# Patient Record
Sex: MALE | Race: WHITE | NOT HISPANIC OR LATINO | Employment: FULL TIME | URBAN - NONMETROPOLITAN AREA
[De-identification: names, ages, dates, MRNs, and addresses within clinical notes are randomized per-mention and may not be internally consistent; named-entity substitution may affect disease eponyms.]

---

## 2023-10-25 ENCOUNTER — OFFICE VISIT (OUTPATIENT)
Dept: INTERNAL MEDICINE | Facility: CLINIC | Age: 33
End: 2023-10-25
Payer: COMMERCIAL

## 2023-10-25 VITALS
HEART RATE: 95 BPM | BODY MASS INDEX: 24.28 KG/M2 | SYSTOLIC BLOOD PRESSURE: 124 MMHG | WEIGHT: 169.6 LBS | OXYGEN SATURATION: 97 % | HEIGHT: 70 IN | TEMPERATURE: 97.3 F | DIASTOLIC BLOOD PRESSURE: 80 MMHG

## 2023-10-25 DIAGNOSIS — K64.9 BLEEDING HEMORRHOIDS: Primary | ICD-10-CM

## 2023-10-25 NOTE — PROGRESS NOTES
"        Subjective     Chief Complaint:  Hemorrhoids    HPI:  Patient presents today to discuss referral for hemorrhoids.  He states that he has had hemorrhoids for several years but has noticed that has gotten significantly worse over the last 3 days.  He states that the other day after he got done running he went to the bathroom and there was blood in his underwear.  However, he initially reported that he only has noticed the blood after having a bowel movement.  He states that the blood is present in the toilet after having a bowel movement as opposed to just when he wipes.  He states that he does have associated pain.  He states that he has lost enough blood that he would be concerned that his hemoglobin may have dropped.  He denies any symptoms that would be concerning for anemia such as shortness of breath, lightheadedness, dizziness, or chest pain.  He has not tried any OTC creams or suppositories.  He is insisting on a referral to general surgery.  He is not interested in establishing care today.    Past Medical History: History reviewed. No pertinent past medical history.  Past Surgical History:  Past Surgical History:   Procedure Laterality Date    CRANIECTOMY      STOMACH SURGERY         Allergies:  No Known Allergies  Medications:  Prior to Admission medications    Not on File       Objective     Vital Signs: /80 (BP Location: Right arm, Patient Position: Sitting, Cuff Size: Adult)   Pulse 95   Temp 97.3 °F (36.3 °C) (Temporal)   Ht 177.8 cm (70\")   Wt 76.9 kg (169 lb 9.6 oz)   SpO2 97%   BMI 24.34 kg/m²   Physical Exam  Vitals and nursing note reviewed.   Constitutional:       General: He is not in acute distress.     Appearance: Normal appearance.   Cardiovascular:      Rate and Rhythm: Normal rate.      Pulses: Normal pulses.   Pulmonary:      Effort: Pulmonary effort is normal. No respiratory distress.   Abdominal:      General: There is no distension.      Palpations: Abdomen is soft.    "   Tenderness: There is no abdominal tenderness.   Skin:     General: Skin is warm and dry.      Findings: No bruising, lesion or rash.   Neurological:      Mental Status: He is alert and oriented to person, place, and time. Mental status is at baseline.      Motor: No weakness.       Results Reviewed:  None available for review    Assessment / Plan     Assessment/Plan:  Diagnoses and all orders for this visit:    1. Bleeding hemorrhoids (Primary)  -     Ambulatory Referral to General Surgery  -     CBC w AUTO Differential       The patient is not interested in establishing care today.  However, I did advise him that we need to proceed with obtaining CBC to make sure that his hemoglobin has not dropped too much since he has been bleeding more over the last 3 days.  We discussed that he needs to try management with a cream or suppositories before being referred to general surgery.  However, he is insisting that a referral be placed and states that medication management will not be effective.  I have placed the referral but I informed the patient that he needs to get Proctozone or Anusol OTC and at least see if this helps while he is waiting on a call from general surgery.  He is agreeable to this.    Return if symptoms worsen or fail to improve. unless patient needs to be seen sooner or acute issues arise.    I have discussed the patient results/orders and and plan/recommendation with them at today's visit.      Mariel Amor, APRN   10/25/2023

## 2023-10-26 ENCOUNTER — TELEPHONE (OUTPATIENT)
Dept: INTERNAL MEDICINE | Facility: CLINIC | Age: 33
End: 2023-10-26

## 2023-10-26 LAB
BASOPHILS # BLD AUTO: 0.04 10*3/MM3 (ref 0–0.2)
BASOPHILS NFR BLD AUTO: 0.6 % (ref 0–1.5)
EOSINOPHIL # BLD AUTO: 0.02 10*3/MM3 (ref 0–0.4)
EOSINOPHIL NFR BLD AUTO: 0.3 % (ref 0.3–6.2)
ERYTHROCYTE [DISTWIDTH] IN BLOOD BY AUTOMATED COUNT: 12.1 % (ref 12.3–15.4)
HCT VFR BLD AUTO: 47.3 % (ref 37.5–51)
HGB BLD-MCNC: 15.6 G/DL (ref 13–17.7)
IMM GRANULOCYTES # BLD AUTO: 0.01 10*3/MM3 (ref 0–0.05)
IMM GRANULOCYTES NFR BLD AUTO: 0.2 % (ref 0–0.5)
LYMPHOCYTES # BLD AUTO: 1.96 10*3/MM3 (ref 0.7–3.1)
LYMPHOCYTES NFR BLD AUTO: 30.7 % (ref 19.6–45.3)
MCH RBC QN AUTO: 28.9 PG (ref 26.6–33)
MCHC RBC AUTO-ENTMCNC: 33 G/DL (ref 31.5–35.7)
MCV RBC AUTO: 87.6 FL (ref 79–97)
MONOCYTES # BLD AUTO: 0.42 10*3/MM3 (ref 0.1–0.9)
MONOCYTES NFR BLD AUTO: 6.6 % (ref 5–12)
NEUTROPHILS # BLD AUTO: 3.94 10*3/MM3 (ref 1.7–7)
NEUTROPHILS NFR BLD AUTO: 61.6 % (ref 42.7–76)
NRBC BLD AUTO-RTO: 0 /100 WBC (ref 0–0.2)
PLATELET # BLD AUTO: 218 10*3/MM3 (ref 140–450)
RBC # BLD AUTO: 5.4 10*6/MM3 (ref 4.14–5.8)
WBC # BLD AUTO: 6.39 10*3/MM3 (ref 3.4–10.8)

## 2023-10-26 NOTE — TELEPHONE ENCOUNTER
Caller: Segundo Moody    Relationship: Self    Best call back number: [phone]     Who are you requesting to speak with (clinical staff, provider,  specific staff member): CLINICAL STAFF    What was the call regarding: PATIENT REQUESTING CALLBACK REGARDING LAB RESULTS.